# Patient Record
Sex: MALE | Race: WHITE | NOT HISPANIC OR LATINO | Employment: FULL TIME | URBAN - METROPOLITAN AREA
[De-identification: names, ages, dates, MRNs, and addresses within clinical notes are randomized per-mention and may not be internally consistent; named-entity substitution may affect disease eponyms.]

---

## 2020-08-28 ENCOUNTER — COMMUNICATION - HEALTHEAST (OUTPATIENT)
Dept: TELEHEALTH | Facility: CLINIC | Age: 55
End: 2020-08-28

## 2020-08-28 ENCOUNTER — OFFICE VISIT - HEALTHEAST (OUTPATIENT)
Dept: FAMILY MEDICINE | Facility: CLINIC | Age: 55
End: 2020-08-28

## 2020-08-28 DIAGNOSIS — Z13.220 LIPID SCREENING: ICD-10-CM

## 2020-08-28 DIAGNOSIS — Z00.00 ROUTINE GENERAL MEDICAL EXAMINATION AT A HEALTH CARE FACILITY: ICD-10-CM

## 2020-08-28 DIAGNOSIS — Z12.5 SCREENING FOR PROSTATE CANCER: ICD-10-CM

## 2020-08-28 DIAGNOSIS — F19.11 HISTORY OF SUBSTANCE ABUSE (H): ICD-10-CM

## 2020-08-28 DIAGNOSIS — M25.562 ACUTE PAIN OF LEFT KNEE: ICD-10-CM

## 2020-08-28 DIAGNOSIS — Z12.11 COLON CANCER SCREENING: ICD-10-CM

## 2020-08-28 DIAGNOSIS — M1A.09X0 IDIOPATHIC CHRONIC GOUT OF MULTIPLE SITES WITHOUT TOPHUS: ICD-10-CM

## 2020-08-28 DIAGNOSIS — Z13.1 DIABETES MELLITUS SCREENING: ICD-10-CM

## 2020-08-28 DIAGNOSIS — Z76.89 ENCOUNTER TO ESTABLISH CARE WITH NEW DOCTOR: ICD-10-CM

## 2020-08-28 DIAGNOSIS — E66.3 OVERWEIGHT WITH BODY MASS INDEX (BMI) OF 29 TO 29.9 IN ADULT: ICD-10-CM

## 2020-08-28 LAB
CHOLEST SERPL-MCNC: 172 MG/DL
FASTING STATUS PATIENT QL REPORTED: NO
HBA1C MFR BLD: 5.7 %
HDLC SERPL-MCNC: 36 MG/DL
LDLC SERPL CALC-MCNC: 113 MG/DL
PSA SERPL-MCNC: 1.3 NG/ML (ref 0–3.5)
TRIGL SERPL-MCNC: 113 MG/DL
URATE SERPL-MCNC: 7.1 MG/DL (ref 3–8)

## 2020-08-28 ASSESSMENT — MIFFLIN-ST. JEOR: SCORE: 1664.68

## 2020-08-29 ENCOUNTER — COMMUNICATION - HEALTHEAST (OUTPATIENT)
Dept: FAMILY MEDICINE | Facility: CLINIC | Age: 55
End: 2020-08-29

## 2020-08-29 DIAGNOSIS — M25.562 ACUTE PAIN OF LEFT KNEE: ICD-10-CM

## 2020-08-29 DIAGNOSIS — M1A.09X0 IDIOPATHIC CHRONIC GOUT OF MULTIPLE SITES WITHOUT TOPHUS: ICD-10-CM

## 2020-09-19 ENCOUNTER — RECORDS - HEALTHEAST (OUTPATIENT)
Dept: ADMINISTRATIVE | Facility: OTHER | Age: 55
End: 2020-09-19

## 2020-09-19 LAB — COLOGUARD-ABSTRACT: NEGATIVE

## 2020-09-28 ENCOUNTER — COMMUNICATION - HEALTHEAST (OUTPATIENT)
Dept: ADMINISTRATIVE | Facility: CLINIC | Age: 55
End: 2020-09-28

## 2020-10-02 ENCOUNTER — RECORDS - HEALTHEAST (OUTPATIENT)
Dept: HEALTH INFORMATION MANAGEMENT | Facility: CLINIC | Age: 55
End: 2020-10-02

## 2021-04-09 ENCOUNTER — IMMUNIZATION (OUTPATIENT)
Dept: NURSING | Facility: CLINIC | Age: 56
End: 2021-04-09
Payer: COMMERCIAL

## 2021-04-09 PROCEDURE — 0001A PR COVID VAC PFIZER DIL RECON 30 MCG/0.3 ML IM: CPT

## 2021-04-09 PROCEDURE — 91300 PR COVID VAC PFIZER DIL RECON 30 MCG/0.3 ML IM: CPT

## 2021-04-30 ENCOUNTER — IMMUNIZATION (OUTPATIENT)
Dept: NURSING | Facility: CLINIC | Age: 56
End: 2021-04-30
Attending: INTERNAL MEDICINE
Payer: COMMERCIAL

## 2021-04-30 PROCEDURE — 91300 PR COVID VAC PFIZER DIL RECON 30 MCG/0.3 ML IM: CPT

## 2021-04-30 PROCEDURE — 0002A PR COVID VAC PFIZER DIL RECON 30 MCG/0.3 ML IM: CPT

## 2021-05-28 ENCOUNTER — RECORDS - HEALTHEAST (OUTPATIENT)
Dept: ADMINISTRATIVE | Facility: CLINIC | Age: 56
End: 2021-05-28

## 2021-06-04 VITALS
SYSTOLIC BLOOD PRESSURE: 122 MMHG | DIASTOLIC BLOOD PRESSURE: 72 MMHG | OXYGEN SATURATION: 97 % | HEIGHT: 67 IN | WEIGHT: 189.8 LBS | BODY MASS INDEX: 29.79 KG/M2 | HEART RATE: 77 BPM

## 2021-06-11 NOTE — TELEPHONE ENCOUNTER
Reviewed - please call pt with negative cologuard result - will retest in 3 years.    Thanks  Dr Swartz

## 2021-06-11 NOTE — PATIENT INSTRUCTIONS - HE
Left knee: likely bursa vs gout flare  -ice to area  -tylenol as needed  -indomethacin   -let me know if not getting better      Gout info if we need to start allopurinol again in the future  Here is how that looks:  1. Start allopurinol 100mg daily  2. Take naproxen 250mg two times a day for 6 months (can also do indomethacin) - this helps prevent a gout flare when starting allopurinol  3. Consider taking pepcid or prilosec while taking naproxen/indomethacin to protect the stomach  4. Check uric acid level 4-6 weeks after starting allopurinol - goal is to get uric acid level less than 6 so would increase allopurinol dose if your level is not low enough  5. Once we get your uric acid level less than 6, you continue that dose of allopurinol long term to treat/prevent gout

## 2021-06-11 NOTE — PROGRESS NOTES
Assessment/Plan:   Terrence is a 55 year old male here for physical with additional concerns.    1. Encounter to establish care with new doctor  Establishing care today - all past medical history reviewed and updated in EMR.    2. Routine general medical examination at a health care facility  Physical completed today. Labs as below. Colon cancer screening ordered. PSA ordered. Up to date with immunizations, recommend flu shot when available and shingles vaccine at pharmacy.     3. History of substance abuse (H)  Hx polysubstance abuse - alcohol, cocaine, marijuana. Has been sober since 2012.     4. Idiopathic chronic gout of multiple sites without tophus  Hx gout, reports previously being on allopurinol and indomethacin but this was many years ago, reports 3 flares per year, no tophi on exam. Discussed based on frequency of flares he would qualify for uric acid lowering therapy such as allopurinol - discussed briefly that this would be started at 100mg dose and need to be taken every day long term, he would also need to take ppx medication (such as naproxen) daily for first 6 months of starting allopurinol to prevent gout flare, we would romel uric acid level 4-6 weeks after initiating allopurinol to adjust dose if needed, goal <6 - pt declines at this time and wishes to continue flare treatment only, we discussed possible joint damage with frequent flares and he voices understanding. Will check uric acid level today with rest of labs. Indomethacin provided for acute L knee gout flare.  - Uric Acid  - indomethacin (INDOCIN) 50 MG capsule; Take 1 capsule (50 mg total) by mouth 3 (three) times a day with meals for 7 days.  Dispense: 21 capsule; Refill: 0    5. Acute pain of left knee  Pt reporting gout flare of L knee - mild redness, mild swelling, tender. No injury to suggest fracture or other such pathology. No fever or abrasions/scratches to suggest septic joint. We discussed treating as gout flare but monitoring for  fever or worsening of symptoms, he would need to go to ER if those arise. Indomethacin sent for acute gout flare management.   - indomethacin (INDOCIN) 50 MG capsule; Take 1 capsule (50 mg total) by mouth 3 (three) times a day with meals for 7 days.  Dispense: 21 capsule; Refill: 0    6. Lipid screening  Given age and weight, will check lipid panel today.  - Lipid [place] RANDOM    7. Diabetes mellitus screening  Given age and weight, will screen for diabetes today.  - Glycosylated Hemoglobin A1c    8. Colon cancer screening  Due for colon cancer screening based on age, pt prefers cologuard after discussion of options, this was ordered today.  - Cologuard    9. Screening for prostate cancer  After discussion of risks and benefits pt opts for PSA testing, some increased nocturia and uncle with possible prostate cancer. Declined ELO.  - PSA, Annual Screen (Prostatic-Specific Antigen)    10. Overweight with body mass index (BMI) of 29 to 29.9 in adult  BMI 29.44 today. Discussed healthy diet and regular exercise for weight loss.    I have had an Advance Directives discussion with the patient.  The following high BMI interventions were performed this visit: encouragement to exercise and lifestyle education regarding diet    Follow up: 1 year for physical, sooner as needed    Lady Swartz MD  [facility]    Subjective:     Terrence Wang is a 55 y.o. male who presents for an annual exam.     Gout  -previously on allopurinol and indomethacin - hasn't been on allopurinol regularly last 5-6 years  -flares intermittently - toe, knee  -both brothers have gout as well  -uric acid 7.6 in 2012  -estimates around 3 flares/year  -currently having L knee pain and mild swelling - reports it feels like a gout flare, started yesterday; no injury/trauma    Healthy Habits:   Healthy Diet: Yes  Regular Exercise: Yes  Sunscreen Use: Yes  Dental Visits Regularly: due for visit  Seat Belt: Yes    Health Maintenance reviewed:  Lipid  Profile: needs  Glucose Screen: needs  Colonoscopy: hasn't had any screening yet, dad has had polyps  Uncle had prostate removed; nml PSA in 2015, some increased nocturia    Immunization History   Administered Date(s) Administered     Influenza,seasonal, Inj IIV3 11/01/2014     Tdap 03/23/2015     Immunization status: up to date and documented.    Current Outpatient Medications   Medication Sig Dispense Refill     indomethacin (INDOCIN) 50 MG capsule Take 1 capsule (50 mg total) by mouth 3 (three) times a day with meals for 7 days. 21 capsule 0     No current facility-administered medications for this visit.      History reviewed. No pertinent past medical history.  Past Surgical History:   Procedure Laterality Date     NO PAST SURGERIES       Patient has no known allergies.  Family History   Problem Relation Age of Onset     Skin cancer Mother      Arthritis Mother         shoulder, knee     Cancer Father         nonhodkins lymphoma (caught early)     No Medical Problems Sister      Gout Brother      Stroke Maternal Grandmother      No Medical Problems Maternal Grandfather      No Medical Problems Paternal Grandmother      Lung cancer Paternal Grandfather         tobacco use     Gout Brother      Social History     Socioeconomic History     Marital status: Single     Spouse name: Not on file     Number of children: 3     Years of education: Not on file     Highest education level: Not on file   Occupational History     Occupation: [employer]   Social Needs     Financial resource strain: Not on file     Food insecurity     Worry: Not on file     Inability: Not on file     Transportation needs     Medical: Not on file     Non-medical: Not on file   Tobacco Use     Smoking status: Never Smoker     Smokeless tobacco: Never Used   Substance and Sexual Activity     Alcohol use: Not Currently     Frequency: Never     Comment: sober almost 8 years (9/24/2012)     Drug use: Not Currently     Types: Cocaine,  "Marijuana     Comment: sober 9/24/2012     Sexual activity: Not on file   Lifestyle     Physical activity     Days per week: Not on file     Minutes per session: Not on file     Stress: Not on file   Relationships     Social connections     Talks on phone: Not on file     Gets together: Not on file     Attends Advent service: Not on file     Active member of club or organization: Not on file     Attends meetings of clubs or organizations: Not on file     Relationship status: Not on file     Intimate partner violence     Fear of current or ex partner: Not on file     Emotionally abused: Not on file     Physically abused: Not on file     Forced sexual activity: Not on file   Other Topics Concern     Not on file   Social History Narrative    3 children       Review of Systems  General:  Denies problem except overweight  Eyes: Denies problem  Ears/Nose/Throat: Denies problem  Cardiovascular: Denies problem  Respiratory:  Denies problem  Gastrointestinal:  Denies problem   Genitourinary: Denies problem  Musculoskeletal:  Denies problem except gout  Skin: Denies problem  Neurologic: Denies problem  Psychiatric: Denies problem  Endocrine: Denies problem  Heme/Lymphatic: Denies problem   Allergic/Immunologic: Denies problem    Objective:        Vitals:    08/28/20 1520   BP: 122/72   Pulse: 77   SpO2: 97%   Weight: 189 lb 12.8 oz (86.1 kg)   Height: 5' 7.32\" (1.71 m)     Body mass index is 29.44 kg/m .    Physical Exam:  General Appearance: Alert, pleasant, appears stated age, overweight  Head: Normocephalic, without obvious abnormality  Eyes: PERRL, conjunctiva/corneas clear, EOM's intact  Ears: Normal TM's and external ear canals, both ears  Nose: Nares normal, septum midline,mucosa normal, no drainage  Throat: Lips, mucosa, and tongue normal; teeth and gums normal; oropharynx is clear  Neck: Supple,without lymphadenopathy, no thyromegaly or nodules noted  Lungs: Clear to auscultation bilaterally, respirations " unlabored  Heart: Regular rate and rhythm, no murmur   Abdomen: Soft, non-tender, no masses, no organomegaly  Genitourinary: no inguinal hernias  Extremities: Extremities with strong and symmetric pulses, no cyanosis; left knee mildly swollen compared to R, tender per pt's report diffusely, faint redness, no abrasions/scratches of skin, normal alignment  Skin: Skin color, texture normal, no rashes or lesions  Neurologic: grossly normal, no focal deficits

## 2021-06-12 NOTE — TELEPHONE ENCOUNTER
Reason contacted:  Test results within this encounter   Information relayed:  Dr Combs notes below   Additional questions:  No  Further follow-up needed:  No  Okay to leave a detailed message:  No

## 2021-06-16 PROBLEM — R73.03 PREDIABETES: Status: ACTIVE | Noted: 2020-08-31

## 2021-06-16 PROBLEM — F19.11 HISTORY OF SUBSTANCE ABUSE (H): Status: ACTIVE | Noted: 2020-08-28

## 2021-06-26 ENCOUNTER — HEALTH MAINTENANCE LETTER (OUTPATIENT)
Age: 56
End: 2021-06-26

## 2021-10-16 ENCOUNTER — HEALTH MAINTENANCE LETTER (OUTPATIENT)
Age: 56
End: 2021-10-16

## 2021-11-24 ENCOUNTER — IMMUNIZATION (OUTPATIENT)
Dept: NURSING | Facility: CLINIC | Age: 56
End: 2021-11-24
Payer: COMMERCIAL

## 2021-11-24 PROCEDURE — 0004A PR COVID VAC PFIZER DIL RECON 30 MCG/0.3 ML IM: CPT

## 2021-11-24 PROCEDURE — 91300 PR COVID VAC PFIZER DIL RECON 30 MCG/0.3 ML IM: CPT

## 2022-08-17 ASSESSMENT — ENCOUNTER SYMPTOMS
SHORTNESS OF BREATH: 0
MYALGIAS: 0
NERVOUS/ANXIOUS: 0
SORE THROAT: 0
PALPITATIONS: 0
FEVER: 0
COUGH: 0
JOINT SWELLING: 0
NAUSEA: 0
HEADACHES: 0
PARESTHESIAS: 0
HEARTBURN: 0
HEMATOCHEZIA: 0
FREQUENCY: 0
ARTHRALGIAS: 0
DIARRHEA: 0
CHILLS: 0
DYSURIA: 0
CONSTIPATION: 0
WEAKNESS: 0
DIZZINESS: 0
ABDOMINAL PAIN: 0
EYE PAIN: 0
HEMATURIA: 0

## 2022-08-24 ENCOUNTER — OFFICE VISIT (OUTPATIENT)
Dept: FAMILY MEDICINE | Facility: CLINIC | Age: 57
End: 2022-08-24
Payer: COMMERCIAL

## 2022-08-24 VITALS
HEART RATE: 73 BPM | BODY MASS INDEX: 27.9 KG/M2 | WEIGHT: 184.06 LBS | HEIGHT: 68 IN | OXYGEN SATURATION: 100 % | DIASTOLIC BLOOD PRESSURE: 70 MMHG | SYSTOLIC BLOOD PRESSURE: 120 MMHG | TEMPERATURE: 98.2 F

## 2022-08-24 DIAGNOSIS — Z11.59 NEED FOR HEPATITIS C SCREENING TEST: ICD-10-CM

## 2022-08-24 DIAGNOSIS — Z00.00 ROUTINE ADULT HEALTH MAINTENANCE: Primary | ICD-10-CM

## 2022-08-24 DIAGNOSIS — Z13.220 LIPID SCREENING: ICD-10-CM

## 2022-08-24 DIAGNOSIS — R73.03 PREDIABETES: ICD-10-CM

## 2022-08-24 DIAGNOSIS — Z11.4 SCREENING FOR HIV (HUMAN IMMUNODEFICIENCY VIRUS): ICD-10-CM

## 2022-08-24 DIAGNOSIS — M1A.09X0 IDIOPATHIC CHRONIC GOUT OF MULTIPLE SITES WITHOUT TOPHUS: ICD-10-CM

## 2022-08-24 DIAGNOSIS — N52.9 ERECTILE DYSFUNCTION, UNSPECIFIED ERECTILE DYSFUNCTION TYPE: ICD-10-CM

## 2022-08-24 DIAGNOSIS — Z12.5 SCREENING FOR PROSTATE CANCER: ICD-10-CM

## 2022-08-24 LAB
ANION GAP SERPL CALCULATED.3IONS-SCNC: 9 MMOL/L (ref 7–15)
BUN SERPL-MCNC: 25.8 MG/DL (ref 6–20)
CALCIUM SERPL-MCNC: 9.1 MG/DL (ref 8.6–10)
CHLORIDE SERPL-SCNC: 106 MMOL/L (ref 98–107)
CHOLEST SERPL-MCNC: 152 MG/DL
CREAT SERPL-MCNC: 0.73 MG/DL (ref 0.67–1.17)
DEPRECATED HCO3 PLAS-SCNC: 25 MMOL/L (ref 22–29)
GFR SERPL CREATININE-BSD FRML MDRD: >90 ML/MIN/1.73M2
GLUCOSE SERPL-MCNC: 111 MG/DL (ref 70–99)
HBA1C MFR BLD: 5.3 % (ref 0–5.6)
HDLC SERPL-MCNC: 39 MG/DL
LDLC SERPL CALC-MCNC: 87 MG/DL
NONHDLC SERPL-MCNC: 113 MG/DL
POTASSIUM SERPL-SCNC: 3.8 MMOL/L (ref 3.4–5.3)
PSA SERPL-MCNC: 1.23 NG/ML (ref 0–3.5)
SODIUM SERPL-SCNC: 140 MMOL/L (ref 136–145)
TRIGL SERPL-MCNC: 128 MG/DL
URATE SERPL-MCNC: 6.1 MG/DL (ref 3.4–7)

## 2022-08-24 PROCEDURE — 36415 COLL VENOUS BLD VENIPUNCTURE: CPT | Performed by: FAMILY MEDICINE

## 2022-08-24 PROCEDURE — G0103 PSA SCREENING: HCPCS | Performed by: FAMILY MEDICINE

## 2022-08-24 PROCEDURE — 83036 HEMOGLOBIN GLYCOSYLATED A1C: CPT | Performed by: FAMILY MEDICINE

## 2022-08-24 PROCEDURE — 87389 HIV-1 AG W/HIV-1&-2 AB AG IA: CPT | Performed by: FAMILY MEDICINE

## 2022-08-24 PROCEDURE — 99396 PREV VISIT EST AGE 40-64: CPT | Performed by: FAMILY MEDICINE

## 2022-08-24 PROCEDURE — 86803 HEPATITIS C AB TEST: CPT | Performed by: FAMILY MEDICINE

## 2022-08-24 PROCEDURE — 80048 BASIC METABOLIC PNL TOTAL CA: CPT | Performed by: FAMILY MEDICINE

## 2022-08-24 PROCEDURE — 80061 LIPID PANEL: CPT | Performed by: FAMILY MEDICINE

## 2022-08-24 PROCEDURE — 84550 ASSAY OF BLOOD/URIC ACID: CPT | Performed by: FAMILY MEDICINE

## 2022-08-24 RX ORDER — INDOMETHACIN 50 MG/1
50 CAPSULE ORAL
Qty: 60 CAPSULE | Refills: 3 | Status: SHIPPED | OUTPATIENT
Start: 2022-08-24 | End: 2023-08-30

## 2022-08-24 RX ORDER — SILDENAFIL 50 MG/1
50-100 TABLET, FILM COATED ORAL DAILY PRN
Qty: 30 TABLET | Refills: 3 | Status: SHIPPED | OUTPATIENT
Start: 2022-08-24 | End: 2023-08-30

## 2022-08-24 NOTE — PROGRESS NOTES
Assessment/Plan:   Terrence is a 57 year old male here for physical     Routine adult health maintenance  Physical completed today.  Labs as below.  Up-to-date with immunizations.  Up-to-date with colon cancer screening.  - Basic metabolic panel  (Ca, Cl, CO2, Creat, Gluc, K, Na, BUN)    Prediabetes  History of prediabetes, will recheck A1c level today.  - Hemoglobin A1c    Lipid screening  Given age, will check lipid panel today.  - Lipid panel reflex to direct LDL Non-fasting    Idiopathic chronic gout of multiple sites without tophus  History of gout, will check uric acid level today.  Patient declines starting daily uric acid lowering medication such as allopurinol, refilled indomethacin for patient to use as needed for flares.  - Uric acid  - indomethacin (INDOCIN) 50 MG capsule  Dispense: 60 capsule; Refill: 3    Screening for prostate cancer  Given age, will check PSA screening.  - PSA, screen    Screening for HIV (human immunodeficiency virus)  Due for one-time HIV screen.  - HIV Antigen Antibody Combo    Need for hepatitis C screening test  Due for one-time hepatitis C screen.  - Hepatitis C Screen Reflex to HCV RNA Quant and Genotype    Erectile dysfunction, unspecified erectile dysfunction type  Patient endorses issues with erectile dysfunction, will trial sildenafil as below, no contraindications noted, follow-up if side effects/issues.  - sildenafil (VIAGRA) 50 MG tablet  Dispense: 30 tablet; Refill: 3       I have had an Advance Directives discussion with the patient.  The following high BMI interventions were performed this visit: encouragement to exercise and lifestyle education regarding diet    Follow up: 1 year for physical, sooner as needed    Lady Swartz MD  [facility]      Subjective:     Terrence Wang is a 57 year old male who presents for an annual exam.     Answers for HPI/ROS submitted by the patient on 8/17/2022  Frequency of exercise:: 2-3 days/week  Getting at least 3  servings of Calcium per day:: NO  Diet:: Other  Taking medications regularly:: Yes  Medication side effects:: None  Bi-annual eye exam:: NO  Dental care twice a year:: NO  Sleep apnea or symptoms of sleep apnea:: None  abdominal pain: No  Blood in stool: No  Blood in urine: No  chest pain: No  chills: No  congestion: No  constipation: No  cough: No  diarrhea: No  dizziness: No  ear pain: No  eye pain: No  nervous/anxious: No  fever: No  frequency: No  genital sores: No  headaches: No  hearing loss: No  heartburn: No  arthralgias: No  joint swelling: No  peripheral edema: No  mood changes: No  myalgias: No  nausea: No  dysuria: No  palpitations: No  Skin sensation changes: No  sore throat: No  urgency: No  rash: No  shortness of breath: No  visual disturbance: No  weakness: No  impotence: Yes  penile discharge: No  Additional concerns today:: Yes  Duration of exercise:: 15-30 minutes    Has had some gout flares since our last visit - 1 flare every couple months - needs indomethacin refill    Noticing issues with ED - difficulties with attaining and maintaining erection    Health Maintenance reviewed:  Lipid Profile: yes  Glucose Screen: yes  Colonoscopy: up to date    Immunization History   Administered Date(s) Administered     COVID-19,PF,Pfizer (12+ Yrs) 04/09/2021, 04/30/2021, 11/24/2021     Influenza (IIV3) PF 11/01/2014     Tdap (Adacel,Boostrix) 03/23/2015     Immunization status: up to date.    Current Outpatient Medications   Medication Sig Dispense Refill     indomethacin (INDOCIN) 50 MG capsule Take 1 capsule (50 mg) by mouth 3 times daily (with meals) for 7 days When needed for gout flares 60 capsule 3     sildenafil (VIAGRA) 50 MG tablet Take 1-2 tablets ( mg) by mouth daily as needed (erectile dysfunction) 30 tablet 3     History reviewed. No pertinent past medical history.  Past Surgical History:   Procedure Laterality Date     NO PAST SURGERIES       Patient has no known allergies.  Family  "History   Problem Relation Age of Onset     Skin Cancer Mother      Arthritis Mother         shoulder, knee     Cancer Father         nonhodkins lymphoma (caught early)     No Known Problems Sister      Gout Brother      Cerebrovascular Disease Maternal Grandmother      No Known Problems Maternal Grandfather      No Known Problems Paternal Grandmother      Lung Cancer Paternal Grandfather         tobacco use     Gout Brother      Social History     Socioeconomic History     Marital status:      Spouse name: Not on file     Number of children: 3     Years of education: Not on file     Highest education level: Not on file   Occupational History     Not on file   Tobacco Use     Smoking status: Never Smoker     Smokeless tobacco: Never Used   Substance and Sexual Activity     Alcohol use: Not Currently     Comment: Alcoholic Drinks/day: sober almost 8 years (9/24/2012)     Drug use: Not Currently     Types: Cocaine, Marijuana     Comment: Drug use: sober 9/24/2012     Sexual activity: Not on file   Other Topics Concern     Not on file   Social History Narrative    3 children     Social Determinants of Health     Financial Resource Strain: Not on file   Food Insecurity: Not on file   Transportation Needs: Not on file   Physical Activity: Not on file   Stress: Not on file   Social Connections: Not on file   Intimate Partner Violence: Not on file   Housing Stability: Not on file       Review of Systems negative unless noted    Objective:        Vitals:    08/24/22 1548   BP: 120/70   Pulse: 73   Temp: 98.2  F (36.8  C)   SpO2: 100%   Weight: 83.5 kg (184 lb 1 oz)   Height: 1.727 m (5' 8\")     Body mass index is 27.99 kg/m .    Physical Exam:  General Appearance: Alert, pleasant, appears stated age  Head: Normocephalic, without obvious abnormality  Eyes: PERRL, conjunctiva/corneas clear, EOM's intact  Ears: Normal TM's and external ear canals, both ears  Neck: Supple,without lymphadenopathy, no thyromegaly or " nodules noted  Lungs: Clear to auscultation bilaterally, respirations unlabored, no wheezing or crackles  Heart: Regular rate and rhythm, no murmur   Abdomen: Soft, non-tender, no masses, no organomegaly  Genitourinary: no inguinal hernias  Extremities: Extremities with strong and symmetric pulses, no cyanosis or edema  Skin: Skin color, texture normal, no rashes or lesions  Neurologic: Grossly normal, no focal deficits

## 2022-08-24 NOTE — PATIENT INSTRUCTIONS
Recommend checking with insurance regarding shingles vaccine - can you get at clinic or pharmacy?    Generic Viagra/sildenafil:   Erectile dysfunction: 50 mg once daily as needed 1 hour before sexual activity; may be taken up to 4 hours before sexual activity. Reduce to 25 mg once daily if side effects occur. May increase to a maximum dose of 100 mg once daily if there is incomplete response.

## 2022-08-25 LAB
HCV AB SERPL QL IA: NONREACTIVE
HIV 1+2 AB+HIV1 P24 AG SERPL QL IA: NONREACTIVE

## 2022-09-25 ENCOUNTER — HEALTH MAINTENANCE LETTER (OUTPATIENT)
Age: 57
End: 2022-09-25

## 2023-08-28 ASSESSMENT — ENCOUNTER SYMPTOMS
HEADACHES: 0
HEMATURIA: 0
NERVOUS/ANXIOUS: 0
SHORTNESS OF BREATH: 0
SORE THROAT: 0
NAUSEA: 0
FEVER: 0
COUGH: 0
PALPITATIONS: 0
DIARRHEA: 0
WEAKNESS: 0
EYE PAIN: 0
HEARTBURN: 0
CONSTIPATION: 0
MYALGIAS: 0
DYSURIA: 0
FREQUENCY: 1
CHILLS: 0
JOINT SWELLING: 0
ARTHRALGIAS: 0
PARESTHESIAS: 0
ABDOMINAL PAIN: 0
HEMATOCHEZIA: 0
DIZZINESS: 0

## 2023-08-29 NOTE — PROGRESS NOTES
Assessment/Plan:   Terrence is a 58 year old male here for physical     Routine adult health maintenance  Physical completed today. Labs as below. Immunizations up to date. Cologuard ordered today.   - Basic metabolic panel  (Ca, Cl, CO2, Creat, Gluc, K, Na, BUN)  - CBC with platelets    Prediabetes  Hx preDM, will recheck A1c today.  - Hemoglobin A1c  - Basic metabolic panel  (Ca, Cl, CO2, Creat, Gluc, K, Na, BUN)  - CBC with platelets    Lipid screening  Given age, will check lipid panel today.   - Lipid panel reflex to direct LDL Non-fasting  - Basic metabolic panel  (Ca, Cl, CO2, Creat, Gluc, K, Na, BUN)  - CBC with platelets    Screening for prostate cancer  Given age, will check screening PSA.  - PSA, screen    Idiopathic chronic gout of multiple sites without tophus  Hx gout, has had 2 flares in past year, will check uric acid, will consider allopurinol if continues to have 2+ episodes per year, refilled indomethacin for flares.  - Basic metabolic panel  (Ca, Cl, CO2, Creat, Gluc, K, Na, BUN)  - CBC with platelets  - Uric acid  - indomethacin (INDOCIN) 50 MG capsule  Dispense: 60 capsule; Refill: 3    Screen for colon cancer  Due for colon cancer screening, prefers cologuard which was ordered today.  - COLOGUARD([employer])    Hepatitis B vaccination status unknown  Hepatitis B status unknown, will check titer today.  - Hepatitis B Surface Antibody    Erectile dysfunction, unspecified erectile dysfunction type  Sildenafil working well for ED, refilled today.   - sildenafil (VIAGRA) 50 MG tablet  Dispense: 30 tablet; Refill: 3    Hx substance abuse (H)  Sober and doing well.        I have had an Advance Directives discussion with the patient.    Follow up: 1 year for physical, sooner as needed    Lady Swartz MD  [facility]      Subjective:     Terrence Wang is a 58 year old male who presents for an annual exam.     Answers submitted by the patient for this visit:  Annual Preventive Visit  (Submitted on 8/28/2023)  Chief Complaint: Annual Exam:  Frequency of exercise:: 1 day/week  Getting at least 3 servings of Calcium per day:: NO  Diet:: Other  Taking medications regularly:: Yes  Medication side effects:: None  Bi-annual eye exam:: Yes  Dental care twice a year:: NO  Sleep apnea or symptoms of sleep apnea:: None  abdominal pain: No  Blood in stool: No  Blood in urine: No  chest pain: No  chills: No  congestion: No  constipation: No  cough: No  diarrhea: No  dizziness: No  ear pain: No  eye pain: No  nervous/anxious: No  fever: No  frequency: Yes  genital sores: No  headaches: No  hearing loss: No  heartburn: No  arthralgias: No  joint swelling: No  peripheral edema: No  mood changes: No  myalgias: No  nausea: No  dysuria: No  palpitations: No  Skin sensation changes: No  sore throat: No  urgency: No  rash: No  shortness of breath: No  visual disturbance: No  weakness: No  impotence: Yes  penile discharge: No  Additional concerns today:: Yes  Exercise outside of work (Submitted on 8/28/2023)  Chief Complaint: Annual Exam:  Duration of exercise:: N/A    Has had some gout flares - 2 flares in past year - thinks was triggered by meat (doesn't drink), has had in feet and L knee - uses indomethacin     No issues with viagra, working well    Health Maintenance reviewed:  Lipid Profile: yes  Glucose Screen: yes  Colonoscopy: due for cologuard    Immunization History   Administered Date(s) Administered    COVID-19 MONOVALENT 12+ (Pfizer) 04/09/2021, 04/30/2021, 11/24/2021    Influenza (IIV3) PF 11/01/2014    TDAP (Adacel,Boostrix) 03/23/2015     Immunization status: up to date and documented.    Current Outpatient Medications   Medication Sig Dispense Refill    indomethacin (INDOCIN) 50 MG capsule Take 1 capsule (50 mg) by mouth 3 times daily (with meals) When needed for gout flares 60 capsule 3    sildenafil (VIAGRA) 50 MG tablet Take 1-2 tablets ( mg) by mouth daily as needed (erectile dysfunction)  "30 tablet 3     History reviewed. No pertinent past medical history.  Past Surgical History:   Procedure Laterality Date    NO PAST SURGERIES       Patient has no known allergies.  Family History   Problem Relation Age of Onset    Skin Cancer Mother     Arthritis Mother         shoulder, knee    Cancer Father         nonhodkins lymphoma (caught early)    No Known Problems Sister     Gout Brother     Cerebrovascular Disease Maternal Grandmother     No Known Problems Maternal Grandfather     No Known Problems Paternal Grandmother     Lung Cancer Paternal Grandfather         tobacco use    Gout Brother      Social History     Socioeconomic History    Marital status:      Spouse name: Not on file    Number of children: 3    Years of education: Not on file    Highest education level: Not on file   Occupational History    Not on file   Tobacco Use    Smoking status: Never    Smokeless tobacco: Never   Substance and Sexual Activity    Alcohol use: Not Currently     Comment: Alcoholic Drinks/day: sober almost 8 years (9/24/2012)    Drug use: Not Currently     Types: Cocaine, Marijuana     Comment: Drug use: sober 9/24/2012    Sexual activity: Not on file   Other Topics Concern    Not on file   Social History Narrative    3 children     Social Determinants of Health     Financial Resource Strain: Not on file   Food Insecurity: Not on file   Transportation Needs: Not on file   Physical Activity: Not on file   Stress: Not on file   Social Connections: Not on file   Intimate Partner Violence: Not on file   Housing Stability: Not on file       Review of Systems negative unless noted    Objective:        Vitals:    08/30/23 1548   BP: 120/60   Pulse: 68   Resp: 18   Temp: 98.3  F (36.8  C)   TempSrc: Temporal   SpO2: 97%   Weight: 86.3 kg (190 lb 3.2 oz)   Height: 1.727 m (5' 8\")   PainSc: No Pain (0)     Body mass index is 28.92 kg/m .    Physical Exam:  General Appearance: Alert, pleasant, appears stated age  Head: " Normocephalic, without obvious abnormality  Eyes: PERRL, conjunctiva/corneas clear, EOM's intact  Ears: Normal TM's and external ear canals, both ears  Nose: Nares normal, septum midline,mucosa normal, no drainage  Throat: Lips, mucosa, and tongue normal; teeth and gums normal; oropharynx is clear  Neck: Supple,without lymphadenopathy, no thyromegaly or nodules noted  Lungs: Clear to auscultation bilaterally, respirations unlabored, no wheezing or crackles  Heart: Regular rate and rhythm, no murmur   Abdomen: Soft, non-tender, no masses, no organomegaly  Extremities: Extremities with strong and symmetric pulses, no cyanosis or edema  Skin: Skin color, texture normal, no rashes or lesions  Neurologic: Grossly normal, no focal deficits

## 2023-08-30 ENCOUNTER — OFFICE VISIT (OUTPATIENT)
Dept: FAMILY MEDICINE | Facility: CLINIC | Age: 58
End: 2023-08-30
Attending: FAMILY MEDICINE
Payer: COMMERCIAL

## 2023-08-30 ENCOUNTER — LAB (OUTPATIENT)
Dept: FAMILY MEDICINE | Facility: CLINIC | Age: 58
End: 2023-08-30

## 2023-08-30 VITALS
RESPIRATION RATE: 18 BRPM | HEART RATE: 68 BPM | TEMPERATURE: 98.3 F | SYSTOLIC BLOOD PRESSURE: 120 MMHG | HEIGHT: 68 IN | DIASTOLIC BLOOD PRESSURE: 60 MMHG | WEIGHT: 190.2 LBS | OXYGEN SATURATION: 97 % | BODY MASS INDEX: 28.82 KG/M2

## 2023-08-30 DIAGNOSIS — R73.03 PREDIABETES: ICD-10-CM

## 2023-08-30 DIAGNOSIS — Z00.00 ROUTINE ADULT HEALTH MAINTENANCE: Primary | ICD-10-CM

## 2023-08-30 DIAGNOSIS — Z78.9 HEPATITIS B VACCINATION STATUS UNKNOWN: ICD-10-CM

## 2023-08-30 DIAGNOSIS — Z12.11 SCREEN FOR COLON CANCER: ICD-10-CM

## 2023-08-30 DIAGNOSIS — F19.11 HISTORY OF SUBSTANCE ABUSE (H): ICD-10-CM

## 2023-08-30 DIAGNOSIS — N52.9 ERECTILE DYSFUNCTION, UNSPECIFIED ERECTILE DYSFUNCTION TYPE: ICD-10-CM

## 2023-08-30 DIAGNOSIS — Z23 ENCOUNTER FOR VACCINATION: ICD-10-CM

## 2023-08-30 DIAGNOSIS — M1A.09X0 IDIOPATHIC CHRONIC GOUT OF MULTIPLE SITES WITHOUT TOPHUS: ICD-10-CM

## 2023-08-30 DIAGNOSIS — Z13.220 LIPID SCREENING: ICD-10-CM

## 2023-08-30 DIAGNOSIS — Z12.5 SCREENING FOR PROSTATE CANCER: ICD-10-CM

## 2023-08-30 LAB
ANION GAP SERPL CALCULATED.3IONS-SCNC: 12 MMOL/L (ref 7–15)
BUN SERPL-MCNC: 19.4 MG/DL (ref 6–20)
CALCIUM SERPL-MCNC: 9.2 MG/DL (ref 8.6–10)
CHLORIDE SERPL-SCNC: 106 MMOL/L (ref 98–107)
CHOLEST SERPL-MCNC: 176 MG/DL
CREAT SERPL-MCNC: 0.65 MG/DL (ref 0.67–1.17)
DEPRECATED HCO3 PLAS-SCNC: 22 MMOL/L (ref 22–29)
ERYTHROCYTE [DISTWIDTH] IN BLOOD BY AUTOMATED COUNT: 12.1 % (ref 10–15)
GFR SERPL CREATININE-BSD FRML MDRD: >90 ML/MIN/1.73M2
GLUCOSE SERPL-MCNC: 96 MG/DL (ref 70–99)
HBA1C MFR BLD: 5.5 % (ref 0–5.6)
HCT VFR BLD AUTO: 40.9 % (ref 40–53)
HDLC SERPL-MCNC: 38 MG/DL
HGB BLD-MCNC: 14.2 G/DL (ref 13.3–17.7)
LDLC SERPL CALC-MCNC: 106 MG/DL
MCH RBC QN AUTO: 31.8 PG (ref 26.5–33)
MCHC RBC AUTO-ENTMCNC: 34.7 G/DL (ref 31.5–36.5)
MCV RBC AUTO: 92 FL (ref 78–100)
NONHDLC SERPL-MCNC: 138 MG/DL
PLATELET # BLD AUTO: 248 10E3/UL (ref 150–450)
POTASSIUM SERPL-SCNC: 4.1 MMOL/L (ref 3.4–5.3)
PSA SERPL DL<=0.01 NG/ML-MCNC: 1.36 NG/ML (ref 0–3.5)
RBC # BLD AUTO: 4.47 10E6/UL (ref 4.4–5.9)
SODIUM SERPL-SCNC: 140 MMOL/L (ref 136–145)
TRIGL SERPL-MCNC: 158 MG/DL
URATE SERPL-MCNC: 6.1 MG/DL (ref 3.4–7)
WBC # BLD AUTO: 10.4 10E3/UL (ref 4–11)

## 2023-08-30 PROCEDURE — 99396 PREV VISIT EST AGE 40-64: CPT | Performed by: FAMILY MEDICINE

## 2023-08-30 PROCEDURE — 85027 COMPLETE CBC AUTOMATED: CPT | Performed by: FAMILY MEDICINE

## 2023-08-30 PROCEDURE — 84550 ASSAY OF BLOOD/URIC ACID: CPT | Performed by: FAMILY MEDICINE

## 2023-08-30 PROCEDURE — 36415 COLL VENOUS BLD VENIPUNCTURE: CPT | Performed by: FAMILY MEDICINE

## 2023-08-30 PROCEDURE — 83036 HEMOGLOBIN GLYCOSYLATED A1C: CPT | Performed by: FAMILY MEDICINE

## 2023-08-30 PROCEDURE — 80061 LIPID PANEL: CPT | Performed by: FAMILY MEDICINE

## 2023-08-30 PROCEDURE — 80048 BASIC METABOLIC PNL TOTAL CA: CPT | Performed by: FAMILY MEDICINE

## 2023-08-30 PROCEDURE — 86706 HEP B SURFACE ANTIBODY: CPT | Performed by: FAMILY MEDICINE

## 2023-08-30 PROCEDURE — G0103 PSA SCREENING: HCPCS | Performed by: FAMILY MEDICINE

## 2023-08-30 RX ORDER — SILDENAFIL 50 MG/1
50-100 TABLET, FILM COATED ORAL DAILY PRN
Qty: 30 TABLET | Refills: 3 | Status: SHIPPED | OUTPATIENT
Start: 2023-08-30 | End: 2024-09-04

## 2023-08-30 RX ORDER — INDOMETHACIN 50 MG/1
50 CAPSULE ORAL
Qty: 60 CAPSULE | Refills: 3 | Status: SHIPPED | OUTPATIENT
Start: 2023-08-30 | End: 2024-09-04

## 2023-08-30 ASSESSMENT — PAIN SCALES - GENERAL: PAINLEVEL: NO PAIN (0)

## 2023-08-31 LAB
HBV SURFACE AB SERPL IA-ACNC: 0.59 M[IU]/ML
HBV SURFACE AB SERPL IA-ACNC: NONREACTIVE M[IU]/ML

## 2023-10-06 LAB — NONINV COLON CA DNA+OCC BLD SCRN STL QL: NEGATIVE

## 2024-09-04 ENCOUNTER — OFFICE VISIT (OUTPATIENT)
Dept: FAMILY MEDICINE | Facility: CLINIC | Age: 59
End: 2024-09-04
Payer: COMMERCIAL

## 2024-09-04 VITALS
SYSTOLIC BLOOD PRESSURE: 124 MMHG | OXYGEN SATURATION: 97 % | RESPIRATION RATE: 18 BRPM | WEIGHT: 191 LBS | HEART RATE: 67 BPM | BODY MASS INDEX: 29.98 KG/M2 | HEIGHT: 67 IN | TEMPERATURE: 98.2 F | DIASTOLIC BLOOD PRESSURE: 79 MMHG

## 2024-09-04 DIAGNOSIS — Z00.00 ROUTINE ADULT HEALTH MAINTENANCE: Primary | ICD-10-CM

## 2024-09-04 DIAGNOSIS — R73.03 PREDIABETES: ICD-10-CM

## 2024-09-04 DIAGNOSIS — Z12.5 SCREENING FOR PROSTATE CANCER: ICD-10-CM

## 2024-09-04 DIAGNOSIS — M1A.09X0 IDIOPATHIC CHRONIC GOUT OF MULTIPLE SITES WITHOUT TOPHUS: ICD-10-CM

## 2024-09-04 DIAGNOSIS — Z23 ENCOUNTER FOR VACCINATION: ICD-10-CM

## 2024-09-04 DIAGNOSIS — Z13.220 LIPID SCREENING: ICD-10-CM

## 2024-09-04 DIAGNOSIS — N52.9 ERECTILE DYSFUNCTION, UNSPECIFIED ERECTILE DYSFUNCTION TYPE: ICD-10-CM

## 2024-09-04 LAB
ERYTHROCYTE [DISTWIDTH] IN BLOOD BY AUTOMATED COUNT: 12.3 % (ref 10–15)
HBA1C MFR BLD: 5.6 % (ref 0–5.6)
HCT VFR BLD AUTO: 42.8 % (ref 40–53)
HGB BLD-MCNC: 14.6 G/DL (ref 13.3–17.7)
MCH RBC QN AUTO: 31.3 PG (ref 26.5–33)
MCHC RBC AUTO-ENTMCNC: 34.1 G/DL (ref 31.5–36.5)
MCV RBC AUTO: 92 FL (ref 78–100)
PLATELET # BLD AUTO: 268 10E3/UL (ref 150–450)
RBC # BLD AUTO: 4.66 10E6/UL (ref 4.4–5.9)
WBC # BLD AUTO: 8.5 10E3/UL (ref 4–11)

## 2024-09-04 PROCEDURE — 90746 HEPB VACCINE 3 DOSE ADULT IM: CPT | Performed by: FAMILY MEDICINE

## 2024-09-04 PROCEDURE — 90673 RIV3 VACCINE NO PRESERV IM: CPT | Performed by: FAMILY MEDICINE

## 2024-09-04 PROCEDURE — 90471 IMMUNIZATION ADMIN: CPT | Performed by: FAMILY MEDICINE

## 2024-09-04 PROCEDURE — G0103 PSA SCREENING: HCPCS | Performed by: FAMILY MEDICINE

## 2024-09-04 PROCEDURE — 84550 ASSAY OF BLOOD/URIC ACID: CPT | Performed by: FAMILY MEDICINE

## 2024-09-04 PROCEDURE — 80061 LIPID PANEL: CPT | Performed by: FAMILY MEDICINE

## 2024-09-04 PROCEDURE — 82306 VITAMIN D 25 HYDROXY: CPT | Performed by: FAMILY MEDICINE

## 2024-09-04 PROCEDURE — 83036 HEMOGLOBIN GLYCOSYLATED A1C: CPT | Performed by: FAMILY MEDICINE

## 2024-09-04 PROCEDURE — 85027 COMPLETE CBC AUTOMATED: CPT | Performed by: FAMILY MEDICINE

## 2024-09-04 PROCEDURE — 90472 IMMUNIZATION ADMIN EACH ADD: CPT | Performed by: FAMILY MEDICINE

## 2024-09-04 PROCEDURE — 36415 COLL VENOUS BLD VENIPUNCTURE: CPT | Performed by: FAMILY MEDICINE

## 2024-09-04 PROCEDURE — 80048 BASIC METABOLIC PNL TOTAL CA: CPT | Performed by: FAMILY MEDICINE

## 2024-09-04 PROCEDURE — 99396 PREV VISIT EST AGE 40-64: CPT | Mod: 25 | Performed by: FAMILY MEDICINE

## 2024-09-04 RX ORDER — INDOMETHACIN 50 MG/1
50 CAPSULE ORAL
Qty: 60 CAPSULE | Refills: 3 | Status: SHIPPED | OUTPATIENT
Start: 2024-09-04

## 2024-09-04 RX ORDER — SILDENAFIL 50 MG/1
50-100 TABLET, FILM COATED ORAL DAILY PRN
Qty: 30 TABLET | Refills: 3 | Status: SHIPPED | OUTPATIENT
Start: 2024-09-04

## 2024-09-04 NOTE — PROGRESS NOTES
Assessment/Plan:   Terrence is a 59 year old male here for physical     Routine adult health maintenance  Physical completed today. Labs as below. Vaccines as below. Up to date with colon cancer screening.  - Basic metabolic panel  (Ca, Cl, CO2, Creat, Gluc, K, Na, BUN)  - CBC with platelets  - Vitamin D deficiency screening    Prediabetes  Hx prediabetes, will check A1c level today.  - Hemoglobin A1c    Lipid screening  Given age and weight, will check lipid panel today.  - Lipid panel reflex to direct LDL Non-fasting    Screening for prostate cancer  Due for yearly PSA screening test.  - PSA, screen    Idiopathic chronic gout of multiple sites without tophus  Hx gout, doing well at this time, refilled indomethacin for PRN use, will check uric acid level today.  - indomethacin (INDOCIN) 50 MG capsule  Dispense: 60 capsule; Refill: 3  - Uric acid    Erectile dysfunction, unspecified erectile dysfunction type  Hx ED, managed with viagra which was refilled today.   - sildenafil (VIAGRA) 50 MG tablet  Dispense: 30 tablet; Refill: 3    Encounter for vaccination  Due for hepatitis B vaccine, first dose given today.   - HEPATITIS B VACCINE (20 YEARS AND OLDER) (ENGERIX-B/RECOMBIVAX)       I have had an Advance Directives discussion with the patient.  The following high BMI interventions were performed this visit: encouragement to exercise and lifestyle education regarding diet    Follow up: 1 year for physical, sooner as needed    Lady Swartz MD  [facility]      Subjective:     Terrence Wang is a 59 year old male who presents for an annual exam.     Question 8/31/2024  5:37 PM CDT - Filed by Patient   In general, how would you rate your overall physical health? Excellent   Do you get at least 3 servings of foods that have calcium each day (dairy, green leafy vegetables, etc)? Yes   Do you have a special diet? Regular (no restrictions)   How many servings of fruits and vegetables do you eat daily? (!) 0-1    On average, how many sweetened beverages do you drink each day (Examples: soda, juice, sweet tea, etc.)? Do NOT count diet or artificially sweetened beverages. 0-1   On average, how many days per week do you engage in moderate to strenuous exercise (like a brisk walk)? 3 days   On average, how many minutes do you engage in exercise at this level? 30 min   How often do you get together with friends or relatives? Once a week   Have you fallen 2 or more times in the past year? No   Trouble with walking or balance? No   Do you see a dentist two times every year? (!) NO   Do you feel stress - tense, restless, nervous, or anxious, or unable to sleep at night because your mind is troubled all the time - these days? Not at all   If you drink alcohol, do you typically have more than 3 drinks per day OR more than 7 drinks per week? Not Applicable   Do you use any substances not prescribed by a provider, out of habit or for other reasons? No   Have you had any new sexual partners since you were last tested for sexually transmitted infections, including HIV? No   Within the past 12 months, did you worry that your food would run out before you got money to buy more? No   Within the past 12 months, did the food you bought just not last and you didn t have money to get more? No   Do you have housing? (Housing is defined as stable permanent housing and does not include staying ouside in a car, in a tent, in an abandoned building, in an overnight shelter, or couch-surfing.) Yes   Are you worried about losing your housing? No   Within the past 12 months, has lack of transportation kept you from medical appointments, getting your medicines, non-medical meetings or appointments, work, or from getting things that you need? No   Within the past 12 months, have you or your family members you live with been unable to get utilities (heat, electricity) when it was really needed? No   Were you born outside of the [place]? No     Question  9/4/2024  3:53 PM CDT - Filed by Patient   The following questionnaire should only be answered by the patient. Are you the patient? Yes   Over the last 2 weeks, how often have you been bothered by any of the following problems?    Q1: Little interest or pleasure in doing things Not at all   Q2: Feeling down, depressed or hopeless Not at all   PHQ2 (   1999 [employer],ALL RIGHTS RESERVED. USED WITH PERMISSION. DEVELOPED BY DELIA NGUYEN,AVERY PÉREZ,SHOSHANA ZURITA AND COLLEAGUES,WITH AN EDUCATIONAL ESTEBAN FROM [employer].)    PHQ-2 Score (range: 0 - 6) 0     Doing well, no concerns    Health Maintenance reviewed:  Lipid Profile: yes  Glucose Screen: yes  Colonoscopy: up to date    Immunization History   Administered Date(s) Administered    COVID-19 MONOVALENT 12+ (Pfizer) 04/09/2021, 04/30/2021, 11/24/2021    Hepatitis B, Adult 09/04/2024    Influenza (IIV3) PF 11/01/2014    Influenza Vaccine IM 18-49 Yrs, (FluBlok) 09/04/2024    MMR 03/26/2024, 05/03/2024    TDAP (Adacel,Boostrix) 03/23/2015     Immunization status: up to date and documented.    Current Outpatient Medications   Medication Sig Dispense Refill    indomethacin (INDOCIN) 50 MG capsule Take 1 capsule (50 mg) by mouth 3 times daily (with meals). When needed for gout flares 60 capsule 3    sildenafil (VIAGRA) 50 MG tablet Take 1-2 tablets ( mg) by mouth daily as needed (erectile dysfunction). 30 tablet 3     History reviewed. No pertinent past medical history.  Past Surgical History:   Procedure Laterality Date    NO PAST SURGERIES       Patient has no known allergies.  Family History   Problem Relation Age of Onset    Skin Cancer Mother     Arthritis Mother         shoulder, knee    Cancer Father         nonhodkins lymphoma (caught early)    Hypertension Father     Other Cancer Father     No Known Problems Sister     Gout Brother     Cerebrovascular Disease Maternal Grandmother     No Known Problems Maternal Grandfather     No Known  Problems Paternal Grandmother     Lung Cancer Paternal Grandfather         tobacco use    Gout Brother      Social History     Socioeconomic History    Marital status:      Spouse name: Not on file    Number of children: 3    Years of education: Not on file    Highest education level: Not on file   Occupational History    Not on file   Tobacco Use    Smoking status: Never    Smokeless tobacco: Never    Tobacco comments:     None   Substance and Sexual Activity    Alcohol use: Not Currently     Comment: Alcoholic Drinks/day: sober almost 8 years (9/24/2012)    Drug use: Not Currently     Types: Cocaine, Marijuana     Comment: Sober almost 11 yrs    Sexual activity: Yes     Partners: Female     Birth control/protection: Male Surgical, None   Other Topics Concern    Parent/sibling w/ CABG, MI or angioplasty before 65F 55M? No   Social History Narrative    3 children     Social Determinants of Health     Financial Resource Strain: Low Risk  (8/31/2024)    Financial Resource Strain     Within the past 12 months, have you or your family members you live with been unable to get utilities (heat, electricity) when it was really needed?: No   Food Insecurity: Low Risk  (8/31/2024)    Food Insecurity     Within the past 12 months, did you worry that your food would run out before you got money to buy more?: No     Within the past 12 months, did the food you bought just not last and you didn t have money to get more?: No   Transportation Needs: Low Risk  (8/31/2024)    Transportation Needs     Within the past 12 months, has lack of transportation kept you from medical appointments, getting your medicines, non-medical meetings or appointments, work, or from getting things that you need?: No   Physical Activity: Insufficiently Active (8/31/2024)    Exercise Vital Sign     Days of Exercise per Week: 3 days     Minutes of Exercise per Session: 30 min   Stress: No Stress Concern Present (8/31/2024)    [employer]  "Occupational Health - Occupational Stress Questionnaire     Feeling of Stress : Not at all   Social Connections: Unknown (8/31/2024)    Social Connection and Isolation Panel [NHANES]     Frequency of Communication with Friends and Family: Not on file     Frequency of Social Gatherings with Friends and Family: Once a week     Attends [employer] Services: Not on file     Active Member of Clubs or Organizations: Not on file     Attends Club or Organization Meetings: Not on file     Marital Status: Not on file   Interpersonal Safety: Low Risk  (9/4/2024)    Interpersonal Safety     Do you feel physically and emotionally safe where you currently live?: Yes     Within the past 12 months, have you been hit, slapped, kicked or otherwise physically hurt by someone?: No     Within the past 12 months, have you been humiliated or emotionally abused in other ways by your partner or ex-partner?: No   Housing Stability: Low Risk  (8/31/2024)    Housing Stability     Do you have housing? : Yes     Are you worried about losing your housing?: No       Review of Systems negative unless noted    Objective:        Vitals:    09/04/24 1615   BP: 124/79   Pulse: 67   Resp: 18   Temp: 98.2  F (36.8  C)   TempSrc: Oral   SpO2: 97%   Weight: 86.6 kg (191 lb)   Height: 1.695 m (5' 6.75\")     Body mass index is 30.14 kg/m .    Physical Exam:  General Appearance: Alert, pleasant, appears stated age  Head: Normocephalic, without obvious abnormality  Eyes: PERRL, conjunctiva/corneas clear, EOM's intact  Ears: Normal TM's and external ear canals, both ears  Nose: Nares normal, septum midline,mucosa normal, no drainage  Throat: Lips, mucosa, and tongue normal; teeth and gums normal; oropharynx is clear  Neck: Supple,without lymphadenopathy, no thyromegaly or nodules noted  Lungs: Clear to auscultation bilaterally, respirations unlabored, no wheezing or crackles  Heart: Regular rate and rhythm, no murmur   Abdomen: Soft, non-tender, no masses, no " organomegaly  Extremities: Extremities with strong and symmetric pulses, no cyanosis or edema  Skin: Skin color, texture normal, no rashes or lesions  Neurologic: Grossly normal, no focal deficits

## 2024-09-05 LAB
ANION GAP SERPL CALCULATED.3IONS-SCNC: 12 MMOL/L (ref 7–15)
BUN SERPL-MCNC: 18.9 MG/DL (ref 6–20)
CALCIUM SERPL-MCNC: 9.1 MG/DL (ref 8.8–10.4)
CHLORIDE SERPL-SCNC: 105 MMOL/L (ref 98–107)
CHOLEST SERPL-MCNC: 184 MG/DL
CREAT SERPL-MCNC: 0.67 MG/DL (ref 0.67–1.17)
EGFRCR SERPLBLD CKD-EPI 2021: >90 ML/MIN/1.73M2
FASTING STATUS PATIENT QL REPORTED: NO
FASTING STATUS PATIENT QL REPORTED: NO
GLUCOSE SERPL-MCNC: 97 MG/DL (ref 70–99)
HCO3 SERPL-SCNC: 24 MMOL/L (ref 22–29)
HDLC SERPL-MCNC: 37 MG/DL
LDLC SERPL CALC-MCNC: 114 MG/DL
NONHDLC SERPL-MCNC: 147 MG/DL
POTASSIUM SERPL-SCNC: 4.1 MMOL/L (ref 3.4–5.3)
PSA SERPL DL<=0.01 NG/ML-MCNC: 1.18 NG/ML (ref 0–3.5)
SODIUM SERPL-SCNC: 141 MMOL/L (ref 135–145)
TRIGL SERPL-MCNC: 163 MG/DL
URATE SERPL-MCNC: 6.3 MG/DL (ref 3.4–7)
VIT D+METAB SERPL-MCNC: 22 NG/ML (ref 20–50)

## 2025-08-05 ENCOUNTER — PATIENT OUTREACH (OUTPATIENT)
Dept: CARE COORDINATION | Facility: CLINIC | Age: 60
End: 2025-08-05
Payer: COMMERCIAL